# Patient Record
Sex: MALE | Race: BLACK OR AFRICAN AMERICAN | NOT HISPANIC OR LATINO | URBAN - METROPOLITAN AREA
[De-identification: names, ages, dates, MRNs, and addresses within clinical notes are randomized per-mention and may not be internally consistent; named-entity substitution may affect disease eponyms.]

---

## 2024-09-11 ENCOUNTER — APPOINTMENT (OUTPATIENT)
Dept: ENDOCRINOLOGY | Facility: CLINIC | Age: 29
End: 2024-09-11

## 2025-04-10 ENCOUNTER — APPOINTMENT (OUTPATIENT)
Dept: RADIOLOGY | Facility: HOSPITAL | Age: 30
End: 2025-04-10

## 2025-04-10 ENCOUNTER — HOSPITAL ENCOUNTER (EMERGENCY)
Facility: HOSPITAL | Age: 30
Discharge: HOME | End: 2025-04-10
Attending: GENERAL PRACTICE

## 2025-04-10 ENCOUNTER — APPOINTMENT (OUTPATIENT)
Dept: CARDIOLOGY | Facility: HOSPITAL | Age: 30
End: 2025-04-10

## 2025-04-10 VITALS
HEART RATE: 90 BPM | DIASTOLIC BLOOD PRESSURE: 73 MMHG | TEMPERATURE: 98.6 F | OXYGEN SATURATION: 97 % | BODY MASS INDEX: 36.4 KG/M2 | HEIGHT: 71 IN | RESPIRATION RATE: 18 BRPM | SYSTOLIC BLOOD PRESSURE: 127 MMHG | WEIGHT: 260 LBS

## 2025-04-10 DIAGNOSIS — R07.9 CHEST PAIN, UNSPECIFIED TYPE: Primary | ICD-10-CM

## 2025-04-10 LAB
ALBUMIN SERPL BCP-MCNC: 5 G/DL (ref 3.4–5)
ALP SERPL-CCNC: 64 U/L (ref 33–120)
ALT SERPL W P-5'-P-CCNC: 45 U/L (ref 10–52)
ANION GAP SERPL CALC-SCNC: 14 MMOL/L (ref 10–20)
AST SERPL W P-5'-P-CCNC: 25 U/L (ref 9–39)
BASOPHILS # BLD AUTO: 0.07 X10*3/UL (ref 0–0.1)
BASOPHILS NFR BLD AUTO: 0.9 %
BILIRUB SERPL-MCNC: 0.9 MG/DL (ref 0–1.2)
BUN SERPL-MCNC: 20 MG/DL (ref 6–23)
CALCIUM SERPL-MCNC: 9.6 MG/DL (ref 8.6–10.3)
CARDIAC TROPONIN I PNL SERPL HS: 3 NG/L (ref 0–20)
CARDIAC TROPONIN I PNL SERPL HS: 3 NG/L (ref 0–20)
CHLORIDE SERPL-SCNC: 102 MMOL/L (ref 98–107)
CO2 SERPL-SCNC: 28 MMOL/L (ref 21–32)
CREAT SERPL-MCNC: 1 MG/DL (ref 0.5–1.3)
D DIMER PPP FEU-MCNC: <215 NG/ML FEU
EGFRCR SERPLBLD CKD-EPI 2021: >90 ML/MIN/1.73M*2
EOSINOPHIL # BLD AUTO: 0.32 X10*3/UL (ref 0–0.7)
EOSINOPHIL NFR BLD AUTO: 3.9 %
ERYTHROCYTE [DISTWIDTH] IN BLOOD BY AUTOMATED COUNT: 13.4 % (ref 11.5–14.5)
GLUCOSE SERPL-MCNC: 109 MG/DL (ref 74–99)
HCT VFR BLD AUTO: 49.9 % (ref 41–52)
HGB BLD-MCNC: 16.1 G/DL (ref 13.5–17.5)
IMM GRANULOCYTES # BLD AUTO: 0.01 X10*3/UL (ref 0–0.7)
IMM GRANULOCYTES NFR BLD AUTO: 0.1 % (ref 0–0.9)
LYMPHOCYTES # BLD AUTO: 2.78 X10*3/UL (ref 1.2–4.8)
LYMPHOCYTES NFR BLD AUTO: 34.2 %
MCH RBC QN AUTO: 27.5 PG (ref 26–34)
MCHC RBC AUTO-ENTMCNC: 32.3 G/DL (ref 32–36)
MCV RBC AUTO: 85 FL (ref 80–100)
MONOCYTES # BLD AUTO: 0.58 X10*3/UL (ref 0.1–1)
MONOCYTES NFR BLD AUTO: 7.1 %
NEUTROPHILS # BLD AUTO: 4.37 X10*3/UL (ref 1.2–7.7)
NEUTROPHILS NFR BLD AUTO: 53.8 %
NRBC BLD-RTO: 0 /100 WBCS (ref 0–0)
PLATELET # BLD AUTO: 284 X10*3/UL (ref 150–450)
POTASSIUM SERPL-SCNC: 3.7 MMOL/L (ref 3.5–5.3)
PROT SERPL-MCNC: 7.7 G/DL (ref 6.4–8.2)
RBC # BLD AUTO: 5.85 X10*6/UL (ref 4.5–5.9)
SODIUM SERPL-SCNC: 140 MMOL/L (ref 136–145)
WBC # BLD AUTO: 8.1 X10*3/UL (ref 4.4–11.3)

## 2025-04-10 PROCEDURE — 93005 ELECTROCARDIOGRAM TRACING: CPT

## 2025-04-10 PROCEDURE — 36415 COLL VENOUS BLD VENIPUNCTURE: CPT | Performed by: NURSE PRACTITIONER

## 2025-04-10 PROCEDURE — 84484 ASSAY OF TROPONIN QUANT: CPT | Performed by: GENERAL PRACTICE

## 2025-04-10 PROCEDURE — 80053 COMPREHEN METABOLIC PANEL: CPT | Performed by: NURSE PRACTITIONER

## 2025-04-10 PROCEDURE — 85379 FIBRIN DEGRADATION QUANT: CPT | Performed by: GENERAL PRACTICE

## 2025-04-10 PROCEDURE — 71045 X-RAY EXAM CHEST 1 VIEW: CPT

## 2025-04-10 PROCEDURE — 71045 X-RAY EXAM CHEST 1 VIEW: CPT | Performed by: RADIOLOGY

## 2025-04-10 PROCEDURE — 36415 COLL VENOUS BLD VENIPUNCTURE: CPT | Performed by: GENERAL PRACTICE

## 2025-04-10 PROCEDURE — 85025 COMPLETE CBC W/AUTO DIFF WBC: CPT | Performed by: NURSE PRACTITIONER

## 2025-04-10 PROCEDURE — 84484 ASSAY OF TROPONIN QUANT: CPT | Performed by: NURSE PRACTITIONER

## 2025-04-10 PROCEDURE — 99285 EMERGENCY DEPT VISIT HI MDM: CPT | Mod: 25 | Performed by: GENERAL PRACTICE

## 2025-04-10 ASSESSMENT — PAIN - FUNCTIONAL ASSESSMENT: PAIN_FUNCTIONAL_ASSESSMENT: 0-10

## 2025-04-10 ASSESSMENT — COLUMBIA-SUICIDE SEVERITY RATING SCALE - C-SSRS
1. IN THE PAST MONTH, HAVE YOU WISHED YOU WERE DEAD OR WISHED YOU COULD GO TO SLEEP AND NOT WAKE UP?: NO
2. HAVE YOU ACTUALLY HAD ANY THOUGHTS OF KILLING YOURSELF?: NO
6. HAVE YOU EVER DONE ANYTHING, STARTED TO DO ANYTHING, OR PREPARED TO DO ANYTHING TO END YOUR LIFE?: NO

## 2025-04-10 ASSESSMENT — PAIN SCALES - GENERAL
PAINLEVEL_OUTOF10: 0 - NO PAIN

## 2025-04-10 NOTE — ED TRIAGE NOTES
As provider-in-triage, I performed a medical screening history and physical exam on this patient.    HISTORY OF PRESENT ILLNESS: 29-year-old -American male with a history of SVT presents with complaints of heart racing x 30 minutes.  He endorses associated shortness of breath, chest pain, lightheaded, and a slight headache.  He reports this happens intermittently but normally lasts a couple seconds and then resolve.  He endorses recent stress and anxiety related to fraud on his account causing late rent payment.  He did not take anything for his pain but does report smoking some marijuana to calm himself down.      PHYSICAL EXAM  Vital Signs reviewed.  Well-appearing, ambulating unassisted, no acute distress, heart is regular rate and rhythm, lungs clear to auscultation     MDM  Plan: Workup initiated. Patient to be moved to the main ER or FT, pending bed availability, for further evaluation, diagnosis, treatment, and disposition.   For the remainder of the patient's workup and ED course, please see the main ED provider note.    CBC, CMP, troponin, EKG  Chest x-ray    I evaluated this patient in triage with the RN. Due to the patient's complaint labs and or imaging were ordered by myself in an attempt to expedite patient care however I am not participating in care after evaluation. This is a preliminary assessment. Pt does not appear in acute distress at this time. They are stable and will have a full evaluation as soon as possible. They will be cared for by another provider who will possibly order more labs, imaging or interventions. Pt did not have a full ROS or PE completed by myself however below is a summary with reasons for orders.

## 2025-04-11 LAB
ATRIAL RATE: 105 BPM
P AXIS: 63 DEGREES
P OFFSET: 188 MS
P ONSET: 127 MS
PR INTERVAL: 164 MS
Q ONSET: 209 MS
QRS COUNT: 17 BEATS
QRS DURATION: 90 MS
QT INTERVAL: 348 MS
QTC CALCULATION(BAZETT): 459 MS
QTC FREDERICIA: 419 MS
R AXIS: 160 DEGREES
T AXIS: 11 DEGREES
T OFFSET: 383 MS
VENTRICULAR RATE: 105 BPM

## 2025-04-14 NOTE — ED PROVIDER NOTES
HPI   Chief Complaint   Patient presents with    Chest Pain       HPI: 29-year-old male with a history of paroxysmal SVT presents with complaints of his heart racing for 30 minutes.  He reports chest tightness, shortness of breath and feeling mildly lightheaded.  He states that this does happen intermittently.  He denies leg swelling, history of DVT/PE, fever, chills and syncope      Limitations to history: None  Independent Historians: Patient  External Records Reviewed: HIE, outpatient notes, inpatient notes  ------------------------------------------------------------------------------------------------------------------------------------------  ROS: a ten point review of systems was performed and was negative except as per HPI.  ------------------------------------------------------------------------------------------------------------------------------------------  PMH / PSH: as per HPI, otherwise reviewed in EMR  MEDS: as per HPI, otherwise reviewed in EMR  ALLERGIES: as per HPI, otherwise reviewed in EMR  SocH:  as per HPI, otherwise reviewed in EMR  FH:  as per HPI, otherwise reviewed in EMR  ------------------------------------------------------------------------------------------------------------------------------------------  Physical Exam:  VS: As documented in the triage note and EMR flowsheet from this visit was reviewed  General: Well appearing. No acute distress.   Eyes:  Extraocular movements grossly intact. No scleral icterus. No discharge  HEENT:  Normocephalic.  Atraumatic  Neck: Moves neck freely. No gross masses  CV: Regular rhythm. No murmurs, rubs or gallops   Resp: Clear to auscultation bilaterally. No respiratory distress.    GI: Soft, no masses, nontender. No rebound tenderness or guarding  MSK: Symmetric muscle bulk. No deformities. No lower extremity edema.    Skin: Warm, dry, intact.   Neuro: No focal deficits.  A&O x3.   Psych: Appropriate for  situation  ------------------------------------------------------------------------------------------------------------------------------------------  Hospital Course / Medical Decision Making:  Independent Interpretations: Chest x-ray  EKG as interpreted by me: Sinus tachycardia 105 bpm with no bundle branch block no signs of acute ischemia    MDM: 29-year-old male with a history of paroxysmal SVT presents for tachycardia, chest tightness and shortness of breath.  He is in a normal sinus rhythm on arrival.  He was mildly tachycardic when he first got to the ED but this decreased without intervention.  Troponin and D-dimer not elevated.  No major abnormalities on CBC or CMP.  Chest x-ray shows no acute cardiopulmonary process.  The patient was observed in the ED for over 4 hours with no recurrence of his symptoms.  He feels safe going home.  He was provided with referrals to primary care and cardiology.  He will follow-up as an outpatient and will return to the ER for any worsening of his symptoms.    Discussion of Management with Other Providers:   I discussed the patient/results with: Emergency medicine team    Final diagnosis and disposition as below.    Results for orders placed or performed during the hospital encounter of 04/10/25  -ECG 12 lead:   Collection Time: 04/10/25  6:52 PM       Result                      Value             Ref Range           Ventricular Rate            105               BPM                 Atrial Rate                 105               BPM                 NC Interval                 164               ms                  QRS Duration                90                ms                  QT Interval                 348               ms                  QTC Calculation(Bazett)     459               ms                  P Axis                      63                degrees             R Axis                      160               degrees             T Axis                      11                 degrees             QRS Count                   17                beats               Q Onset                     209               ms                  P Onset                     127               ms                  P Offset                    188               ms                  T Offset                    383               ms                  QTC Fredericia              419               ms             -CBC and Auto Differential:   Collection Time: 04/10/25  7:22 PM       Result                      Value             Ref Range           WBC                         8.1               4.4 - 11.3 x*       nRBC                        0.0               0.0 - 0.0 /1*       RBC                         5.85              4.50 - 5.90 *       Hemoglobin                  16.1              13.5 - 17.5 *       Hematocrit                  49.9              41.0 - 52.0 %       MCV                         85                80 - 100 fL         MCH                         27.5              26.0 - 34.0 *       MCHC                        32.3              32.0 - 36.0 *       RDW                         13.4              11.5 - 14.5 %       Platelets                   284               150 - 450 x1*       Neutrophils %               53.8              40.0 - 80.0 %       Immature Granulocytes *     0.1               0.0 - 0.9 %         Lymphocytes %               34.2              13.0 - 44.0 %       Monocytes %                 7.1               2.0 - 10.0 %        Eosinophils %               3.9               0.0 - 6.0 %         Basophils %                 0.9               0.0 - 2.0 %         Neutrophils Absolute        4.37              1.20 - 7.70 *       Immature Granulocytes *     0.01              0.00 - 0.70 *       Lymphocytes Absolute        2.78              1.20 - 4.80 *       Monocytes Absolute          0.58              0.10 - 1.00 *       Eosinophils Absolute        0.32              0.00 - 0.70 *       Basophils  Absolute          0.07              0.00 - 0.10 *  -Comprehensive metabolic panel:   Collection Time: 04/10/25  7:22 PM       Result                      Value             Ref Range           Glucose                     109 (H)           74 - 99 mg/dL       Sodium                      140               136 - 145 mm*       Potassium                   3.7               3.5 - 5.3 mm*       Chloride                    102               98 - 107 mmo*       Bicarbonate                 28                21 - 32 mmol*       Anion Gap                   14                10 - 20 mmol*       Urea Nitrogen               20                6 - 23 mg/dL        Creatinine                  1.00              0.50 - 1.30 *       eGFR                        >90               >60 mL/min/1*       Calcium                     9.6               8.6 - 10.3 m*       Albumin                     5.0               3.4 - 5.0 g/*       Alkaline Phosphatase        64                33 - 120 U/L        Total Protein               7.7               6.4 - 8.2 g/*       AST                         25                9 - 39 U/L          Bilirubin, Total            0.9               0.0 - 1.2 mg*       ALT                         45                10 - 52 U/L    -Troponin I, High Sensitivity:   Collection Time: 04/10/25  7:22 PM       Result                      Value             Ref Range           Troponin I, High Sensi*     3                 0 - 20 ng/L    -D-Dimer, VTE Exclusion:   Collection Time: 04/10/25  9:14 PM       Result                      Value             Ref Range           D-Dimer, Quantitative *     <215              <=500 ng/mL *  -Troponin I, High Sensitivity:   Collection Time: 04/10/25  9:14 PM       Result                      Value             Ref Range           Troponin I, High Sensi*     3                 0 - 20 ng/L    XR chest 1 view   Final Result    No acute cardiopulmonary process.          MACRO:    None          Signed  by: Sigrid Escamilla 4/10/2025 7:47 PM    Dictation workstation:   [id number]                   Patient History   No past medical history on file.  No past surgical history on file.  No family history on file.  Social History     Tobacco Use    Smoking status: Not on file    Smokeless tobacco: Not on file   Substance Use Topics    Alcohol use: Not on file    Drug use: Not on file       Physical Exam   ED Triage Vitals   Temperature Heart Rate Respirations BP   04/10/25 1847 04/10/25 1847 04/10/25 1847 04/10/25 1847   37 °C (98.6 °F) (!) 108 20 (!) 141/99      Pulse Ox Temp Source Heart Rate Source Patient Position   04/10/25 1847 04/10/25 1847 04/10/25 2155 04/10/25 2155   99 % Oral Monitor Lying      BP Location FiO2 (%)     04/10/25 2155 --     Right arm        Physical Exam      ED Course & MDM   Diagnoses as of 04/14/25 1750   Chest pain, unspecified type                 No data recorded     [employer] Coma Scale Score: 15 (04/10/25 2114 : Gregoria Andrade, WEST)                           Medical Decision Making      Procedure  Procedures     Chris Arguelles DO  04/14/25 1752